# Patient Record
Sex: FEMALE
[De-identification: names, ages, dates, MRNs, and addresses within clinical notes are randomized per-mention and may not be internally consistent; named-entity substitution may affect disease eponyms.]

---

## 2020-08-31 ENCOUNTER — HOSPITAL ENCOUNTER (OUTPATIENT)
Dept: HOSPITAL 5 - ED | Age: 55
Setting detail: OBSERVATION
Discharge: PSYCHIATRIC OR SUBSTANCE USE FACILITY | End: 2020-08-31
Attending: INTERNAL MEDICINE | Admitting: INTERNAL MEDICINE
Payer: COMMERCIAL

## 2020-08-31 VITALS — DIASTOLIC BLOOD PRESSURE: 53 MMHG | SYSTOLIC BLOOD PRESSURE: 97 MMHG

## 2020-08-31 DIAGNOSIS — I25.10: ICD-10-CM

## 2020-08-31 DIAGNOSIS — Z79.82: ICD-10-CM

## 2020-08-31 DIAGNOSIS — Z86.718: ICD-10-CM

## 2020-08-31 DIAGNOSIS — R07.89: Primary | ICD-10-CM

## 2020-08-31 DIAGNOSIS — F17.210: ICD-10-CM

## 2020-08-31 DIAGNOSIS — Z86.73: ICD-10-CM

## 2020-08-31 DIAGNOSIS — J44.9: ICD-10-CM

## 2020-08-31 DIAGNOSIS — Z95.1: ICD-10-CM

## 2020-08-31 LAB
BASOPHILS # (AUTO): 0.1 K/MM3 (ref 0–0.1)
BASOPHILS NFR BLD AUTO: 0.8 % (ref 0–1.8)
BUN SERPL-MCNC: 12 MG/DL (ref 7–17)
BUN/CREAT SERPL: 20 %
CALCIUM SERPL-MCNC: 9.1 MG/DL (ref 8.4–10.2)
EOSINOPHIL # BLD AUTO: 0.2 K/MM3 (ref 0–0.4)
EOSINOPHIL NFR BLD AUTO: 2.4 % (ref 0–4.3)
HCT VFR BLD CALC: 38 % (ref 30.3–42.9)
HEMOLYSIS INDEX: 10
HGB BLD-MCNC: 12.8 GM/DL (ref 10.1–14.3)
LYMPHOCYTES # BLD AUTO: 1.8 K/MM3 (ref 1.2–5.4)
LYMPHOCYTES NFR BLD AUTO: 23.8 % (ref 13.4–35)
MCHC RBC AUTO-ENTMCNC: 34 % (ref 30–34)
MCV RBC AUTO: 91 FL (ref 79–97)
MONOCYTES # (AUTO): 0.5 K/MM3 (ref 0–0.8)
MONOCYTES % (AUTO): 7.2 % (ref 0–7.3)
PLATELET # BLD: 284 K/MM3 (ref 140–440)
RBC # BLD AUTO: 4.2 M/MM3 (ref 3.65–5.03)

## 2020-08-31 PROCEDURE — 96374 THER/PROPH/DIAG INJ IV PUSH: CPT

## 2020-08-31 PROCEDURE — G0378 HOSPITAL OBSERVATION PER HR: HCPCS

## 2020-08-31 PROCEDURE — 93005 ELECTROCARDIOGRAM TRACING: CPT

## 2020-08-31 PROCEDURE — A9502 TC99M TETROFOSMIN: HCPCS

## 2020-08-31 PROCEDURE — 99285 EMERGENCY DEPT VISIT HI MDM: CPT

## 2020-08-31 PROCEDURE — 71045 X-RAY EXAM CHEST 1 VIEW: CPT

## 2020-08-31 PROCEDURE — 82550 ASSAY OF CK (CPK): CPT

## 2020-08-31 PROCEDURE — 93017 CV STRESS TEST TRACING ONLY: CPT

## 2020-08-31 PROCEDURE — 84484 ASSAY OF TROPONIN QUANT: CPT

## 2020-08-31 PROCEDURE — 82553 CREATINE MB FRACTION: CPT

## 2020-08-31 PROCEDURE — 78452 HT MUSCLE IMAGE SPECT MULT: CPT

## 2020-08-31 PROCEDURE — 80048 BASIC METABOLIC PNL TOTAL CA: CPT

## 2020-08-31 PROCEDURE — 85025 COMPLETE CBC W/AUTO DIFF WBC: CPT

## 2020-08-31 PROCEDURE — 36415 COLL VENOUS BLD VENIPUNCTURE: CPT

## 2020-08-31 PROCEDURE — 96375 TX/PRO/DX INJ NEW DRUG ADDON: CPT

## 2020-08-31 PROCEDURE — 96372 THER/PROPH/DIAG INJ SC/IM: CPT

## 2020-08-31 RX ADMIN — NITROGLYCERIN SCH: 20 OINTMENT TOPICAL at 14:00

## 2020-08-31 RX ADMIN — NITROGLYCERIN SCH: 20 OINTMENT TOPICAL at 18:41

## 2020-08-31 RX ADMIN — NITROGLYCERIN SCH INCH: 20 OINTMENT TOPICAL at 11:42

## 2020-08-31 NOTE — TREADMILL REPORT
NUCLEAR PERFUSION SCAN



REFERRING PHYSICIAN:  Dr. Moss 



PROTOCOL:  The patient was assessed in postoperative state, given 10 mCi of

technetium 99m at rest.  The patient underwent rest imaging.  The patient

underwent Lexiscan stress test per standard protocol.  At peak stress, patient

was given 26 mCi of technetium 99m.  Shortly thereafter, the patient underwent

stress imaging.  Raw imaging reveals mild GI artifact, no significant motion

effect.  SPECT imaging examined carefully in horizontal long axis, vertical long

axis, short axis views.  There is normal homogenous uptake of radioisotope in

all four segments.  No evidence of significant fixed or reversible perfusion

defect suggestive of prior infarction or ischemia.  Gated wall motion reveals

normal systolic thickening, calculated ejection fraction 84%.



CONCLUSIONS:

1.  Normal myocardial perfusion scan without evidence of active ischemia or

prior infarction.

2.  Normal left ventricular systolic performance without evidence of transient

ischemic dilatation or stress-induced segmental wall motion abnormalities.





DD: 08/31/2020 10:52

DT: 08/31/2020 13:38

JOB# [id number]  [id number]

SBM/NTS

## 2020-08-31 NOTE — DISCHARGE SUMMARY
Providers





- Providers


Date of Admission: 


08/31/20 03:35





Attending physician: 


MALINDA REESE MD





                                        





08/31/20 04:29


Consult to Mental Health [CONS] Routine 


   Reason For Exam: SUICIDAL IDEATION HISTORY





08/31/20 07:35


Consult to Physician [CONS] Routine 


   Comment: 


   Consulting Provider: EUGENIA BYRNE


   Physician Instructions: 


   Reason For Exam: chest pain











Primary care physician: 


DOTTIE TRAN MD








Hospitalization


Reason for admission: Chest pain


Condition: Fair


Hospital course: 


55 year old female sent over from [facility] because of 

pressure like chest pain that started yesterday evening and associated with 

nausea but no vomiting . There is no history of shortness of breath, fever, 

chills, cough or bodyache. Patient had a document from the psychiatric facility 

from where she came showing 1014 form that is an involuntary confinement 

extension.  





Patient on admission was risk stratified and underwent stress test which was 

negative for acute ischemic changes.  She does not report any chest pain at this

 time she is tolerating her diet and is medically cleared for discharge back to 

[facility].








Atypical chest pain secondary to costochondritis


Suicidal ideation


Nausea now resolved


Disposition: DC/TX-65 PSY HOSP/PSY UNIT


Time spent for discharge: 35-minute





Core Measure Documentation





- Palliative Care


Palliative Care/ Comfort Measures: Not Applicable





- Core Measures


Any of the following diagnoses?: none





Exam





- Physical Exam


Narrative exam: 


VITAL SIGNS:  Reviewed.    


GENERAL:  The patient appears normally developed, Vital signs as documented.


HEAD:  No signs of head trauma.


EYES:  Pupils are equal.  Extraocular motions intact.  


EARS:  Hearing grossly intact.


MOUTH:  Oropharynx is normal. 


NECK:  No adenopathy, no JVD.  


CHEST:  Chest with clear breath sounds bilaterally.  No wheezes, rales, or 

rhonchi.  


CARDIAC:  Regular rate and rhythm.  S1 and S2, without murmurs, gallops, or 

rubs.


VASCULAR:  No Edema.  Peripheral pulses normal and equal in all extremities.


ABDOMEN:  Soft, non tender and non distended.  No   rebound or guarding, and no 

masses palpated.   Bowel Sounds normal.


MUSCULOSKELETAL:  Good range of motion of all major joints. Extremities without 

clubbing, cyanosis or edema.  


NEUROLOGIC EXAM:  Alert and oriented x 3   No focal sensory or strength 

deficits.   Speech normal.  Follows commands.


PSYCHIATRIC:  Mood normal.


SKIN:  detail exam as documented in skin assessment








- Constitutional


Vitals: 


                                        











Temp Pulse Resp BP Pulse Ox


 


 98.9 F   71   17   133/80   96 


 


 08/31/20 11:10  08/31/20 11:42  08/31/20 11:10  08/31/20 11:42  08/31/20 11:10














Plan


Activity: advance as tolerated, fall precautions


Diet: low fat


Special Instructions: record daily weights, record daily BP diary


Follow up with: 


DOTTIE TRAN MD [Primary Care Provider] - 3-5 Days

## 2020-08-31 NOTE — EVENT NOTE
Date: 08/31/20


Detailed cardiology consultation dictated. 


S/p lexiscan MPI stress test this AM which was negative. 


Currently stable cardiac status. Pt may discharge from cardiology standpoint. 

Recommend pt follow up in our office with Dr. MILADYS Long within 2 weeks 

([phone]). 





TOBIAS MARQUEZ NP / DR. MILADYS LONG

## 2020-08-31 NOTE — EMERGENCY DEPARTMENT REPORT
HPI





- General


Chief Complaint: Chest Pain


Time Seen by Provider: 20 01:38





- [facility]


HPI: 





Room 1





The patient is a 55-year-old female present with a chief complaint of chest 

pain.  Patient states this evening she developed aching chest pain that is been 

intermittent in nature and associated with nausea without vomiting.  Patient 

denies shortness of breath or diaphoresis.  The patient is currently a patient 

at [facility] and was sent over for evaluation of her chest pain.  The 

patient states for the past 3 days she has had "heartburn."  The pain that 

started this evening was different from her "heartburn."





ED Past Medical Hx





- Past Medical History


Previous Medical History?: Yes


Hx CVA: Yes (old left weakness)


Hx Heart Attack/AMI: Yes


Hx Deep Vein Thrombosis: Yes


Hx COPD: Yes (No home O2)





- Surgical History


Past Surgical History?: Yes


Hx Coronary Stent: Yes ()





- Family History


Family history: no significant





- Social History


Smoking Status: Current Every Day Smoker (Less than 1 pack/day)


Substance Use Type: Marijuana





ED Review of Systems


ROS: 


Stated complaint: CHEST PAIN


Other details as noted in HPI





Constitutional: denies: diaphoresis


Respiratory: denies: shortness of breath


Cardiovascular: chest pain


Endocrine: no symptoms reported


Gastrointestinal: nausea.  denies: vomiting


Musculoskeletal: back pain





Physical Exam





- Physical Exam


Vital Signs: 


                                   Vital Signs











  20





  01:40


 


Temperature 97.8 F


 


Pulse Rate 88


 


Respiratory 18





Rate 


 


Blood Pressure 120/74





[Right] 


 


O2 Sat by Pulse 94





Oximetry 











Physical Exam: 





GENERAL: The patient is well-developed well-nourished female lying on stretcher 

not appearing to be in acute distress. []


HEENT: Normocephalic.  Atraumatic.  Extraocular motions are intact.  Patient has

 moist mucous membranes.


NECK: Supple.  Trachea midline


CHEST/LUNGS: Clear to auscultation.  There is no respiratory distress noted.


HEART/CARDIOVASCULAR: Regular.  There is no tachycardia.  There is no gallop rub

 or murmur.


ABDOMEN: Abdomen is soft, nontender.  Patient has normal bowel sounds.  There is

 no abdominal distention.


SKIN: There is no rash.  There is no edema.  There is no diaphoresis.


NEURO: The patient is awake, alert, and oriented.  The patient is cooperative.  

The patient has normal speech


MUSCULOSKELETAL: There is no evidence of acute injury.





ED Course


                                   Vital Signs











  20





  01:40


 


Temperature 97.8 F


 


Pulse Rate 88


 


Respiratory 18





Rate 


 


Blood Pressure 120/74





[Right] 


 


O2 Sat by Pulse 94





Oximetry 














ED Medical Decision Making





- Lab Data


Result diagrams: 


                                 20 02:21





                                 20 02:21





                                Laboratory Tests











  20





  02:21 02:21


 


WBC  7.4 


 


RBC  4.20 


 


Hgb  12.8 


 


Hct  38.0 


 


MCV  91 


 


MCH  31 


 


MCHC  34 


 


RDW  13.5 


 


Plt Count  284 


 


Lymph % (Auto)  23.8 


 


Mono % (Auto)  7.2 


 


Eos % (Auto)  2.4 


 


Baso % (Auto)  0.8 


 


Lymph #  1.8 


 


Mono #  0.5 


 


Eos #  0.2 


 


Baso #  0.1 


 


Seg Neutrophils %  65.8 


 


Seg Neutrophils #  4.9 


 


Sodium   139


 


Potassium   3.8


 


Chloride   103.7


 


Carbon Dioxide   22


 


Anion Gap   17


 


BUN   12


 


Creatinine   0.6


 


Estimated GFR   > 60


 


BUN/Creatinine Ratio   20


 


Glucose   102 H


 


Calcium   9.1


 


Total Creatine Kinase   51


 


CK-MB (CK-2)   < 1.0


 


CK-MB (CK-2) Rel Index   1.9


 


Troponin T   < 0.010














- EKG Data


-: EKG Interpreted by Me


EKG shows normal: sinus rhythm


Rate: normal





- EKG Data


When compared to previous EKG there are: previous EKG unavailable


Interpretation: other (No ischemic changes seen)





- Radiology Data


Radiology results: report reviewed (Chest x-ray), image reviewed (Chest x-ray)


interpreted by me: 





Chest x-ray-no focal infiltrates, no pneumothorax





Findings


[facility] [address] [place] [address] 

XRay Report Signed Patient: HAROON MARROQUIN MR#: [id number] : 1965 

Acct:[id number] Age/Sex: 55 / F ADM Date: 20 Loc: ED Attending Dr: 

Ordering Physician: KIMBERLY HERNANDEZ MD Date of Service: 20 Procedure(s): XR 

chest 1V ap Accession Number(s): [id number] cc: KIMBERLY HERNANDEZ MD Fluoro Time In Min

utes: CHEST 1 VIEW INDICATION: chest pain. COMPARISON: None FINDINGS: Support 

devices: None. Heart: Within normal limits. Lungs/Pleura: No acute air space or 

interstitial disease. Additional findings: None. IMPRESSION: No acute 

abnormality. Signer Name: Gerard Rueda MD Signed: 2020 2:43 AM W

orkstation Name: [id number] Transcribed By: ES Dictated By: Gerard Rueda MD Electronically Authenticated By: Gerard Rueda MD Signed 

Date/Time: 20 DD/DT: 20 TD/TT: 











- Differential Diagnosis


ACS, pericarditis, GERD


Critical care attestation.: 


If time is entered above; I have spent that time in minutes in the direct care 

of this critically ill patient, excluding procedure time.








ED Disposition


Clinical Impression: 


 Chest pain





Disposition: DC-09 OP ADMIT IP TO THIS HOSP


Is pt being admited?: Yes


Does the pt Need Aspirin: Yes


Condition: Fair


Instructions:  Chest Pain (ED)


Time of Disposition: 03:30 (Hospitalist paged (Dr Moss))

## 2020-08-31 NOTE — CONSULTATION
REFERRING PHYSICIAN:  Hospitalist service.



REASON FOR CONSULTATION:  Advice and opinion regarding chest pain.



HISTORY OF PRESENT ILLNESS:  The patient is a 55-year-old  female sent

to [facility] from [facility] for chest pain started

yesterday, questionable nausea, but no vomiting.  She is seen in the stress lab.

 She is in involuntary confinement due to her psychiatric history.  She at this

time has no symptoms.  No chest pain, shortness of breath, syncope  or

presyncope.  The patient states she has no symptoms since yesterday, feels much

better.



PAST MEDICAL HISTORY:  Apparently, there is a history of heart disease, more

specifically coronary artery disease and PCI, COPD, questionable history of CVA.



SOCIAL HISTORY:  Denies smoking or drugs.



FAMILY HISTORY:  No family history of premature heart disease.



ALLERGIES:  No known drug, food, or environmental allergies.



REVIEW OF SYSTEMS:  As per HPI.



MEDICATIONS:  Inpatient and outpatient medications reviewed.



PHYSICAL EXAMINATION:

VITAL SIGNS:  Blood pressure is 123/70.  She is afebrile.  Tele reveals sinus

rhythm in the 60s.  No dysrhythmias.

GENERAL:  This is a young  female, in no apparent distress, alert and

oriented x 3.

HEENT:  Sclerae are anicteric.  PERRLA.

NECK:  Supple, no masses, no JVD.

CHEST:  Clear to auscultation bilaterally.  Good air movement.

CARDIOVASCULAR:  Regular rhythm, S1, S2.

ABDOMEN:  Soft, nontender, nondistended.  Normoactive bowel sounds in 4

quadrants.  No mass or bruits.

EXTREMITIES:  No cyanosis, clubbing, or edema.  Good peripheral pulses.

SKIN:  Intact.  No rashes.



DATA:  ECG is nonacute sinus rhythm, normal axis.  Cardiac enzymes negative x 2.

 CBC and CMP unremarkable.



ASSESSMENT AND PLAN:  In summary, the patient is a pleasant 55-year-old

 female.

Chest pain, with typical and atypical features with a history of PCI.  Continue

aspirin.  Chest pain free.  Stress test this a.m. revealed no evidence of

ischemia or prior infarction, normal LV function.  Continue full-dose aspirin,

statin therapy, primary and secondary prevention measures.  She is chest pain

free at this point.  Other treatment as per primary team.  No changes from a

cardiac perspective at this point.  The patient remains asymptomatic.





DD: 08/31/2020 11:13

DT: 08/31/2020 14:16

JOB# [id number]  [id number]

SBM/NTS

## 2020-08-31 NOTE — HISTORY AND PHYSICAL REPORT
History of Present Illness


Date of examination: 08/31/20


Date of admission: 


08/31/20 03:35





Chief complaint: 





CHEST PAIN


History of present illness: 





55 year old female sent over from [place] psychiatric facility because of 

pressure like chest pain that started yesterday evening and associated with 

nausea but no vomiting . There is no history of shortness of breath, fever, 

chills, cough or bodyache. Patient had a document from the psychiatric facility 

from where she came showing 1014 form that is an involuntary confinement 

extension.  





Past History


Past Medical History: CAD, COPD, DVT, stroke


Past Surgical History: Other (CARDIAC STENT)


Social history: no significant social history


Family history: no significant family history





Medications and Allergies


                                    Allergies











Allergy/AdvReac Type Severity Reaction Status Date / Time


 


No Known Allergies Allergy   Verified 08/31/20 04:41











                                Home Medications











 Medication  Instructions  Recorded  Confirmed  Last Taken  Type


 


Albuterol Sulfate [Proair 90 mcg IH Q8H PRN 08/31/20 08/31/20 Unknown History





Respiclick]     


 


Aspirin [Aspirin BABY CHEW TAB] 81 mg PO QDAY 08/31/20 08/31/20 Unknown History


 


Atorvastatin Calcium [Lipitor] 80 mg PO DAILY 08/31/20 08/31/20 Unknown History


 


Cyclobenzaprine [Flexeril] 5 mg PO TID PRN 08/31/20 08/31/20 Unknown History


 


FLUoxetine [PROzac] 20 mg PO QDAY 08/31/20 08/31/20 Unknown History


 


Metoprolol Succinate [Toprol Xl] 25 mg PO DAILY 08/31/20 08/31/20 Unknown 

History


 


amLODIPine [Norvasc] 5 mg PO DAILY 08/31/20 08/31/20 Unknown History


 


traMADoL [Ultram] 50 mg PO Q6HR PRN 08/31/20 08/31/20 Unknown History











Active Meds: 


Active Medications





Acetaminophen (Tylenol)  650 mg PO Q4H PRN


   PRN Reason: Headache


Aspirin (Aspirin)  325 mg PO QDAY [facility]


Heparin Sodium (Porcine) (Heparin)  5,000 unit SUB-Q Q12HR LUISA


Morphine Sulfate (Morphine)  2 mg IV Q4H PRN


   PRN Reason: Pain, Moderate (4-6)


Nitroglycerin (Nitro-Bid 2%)  0.5 inch TP QIDNTG LUISA; Protocol


Nitroglycerin (Nitrostat)  0.4 mg SL .Q5MIN PRN


   PRN Reason: Chest Pain


Ondansetron HCl (Zofran)  4 mg IV Q8H PRN


   PRN Reason: Nausea And Vomiting











Review of Systems


Constitutional: no weight gain, no fever, no chills, no sweats, no fatigue, no 

weakness, no malaise


Eyes: bilateral: other (NO BILATERAL EYE SYMPTOM)


Ears, nose, mouth and throat: no ear pain, no ear discharge, no tinnitis, no 

decreased hearing, no nose pain, no nasal congestion, no dental pain, no mouth 

pain, no dysphagia


Breasts: deferred


Cardiovascular: chest pain, no palpitations, no rapid/irregular heart beat, no 

syncope, no lightheadedness, no shortness of breath


Respiratory: no cough, no excessive sputum, no hemoptysis, no shortness of 

breath, no congestion, no wheezing


Gastrointestinal: nausea, no abdominal pain, no vomiting, no diarrhea, no 

constipation, no change in bowel habits, no hematemesis, no coffee ground emesis


Genitourinary Female: no flank pain


Rectal: no pain


Musculoskeletal: no neck stiffness, no neck pain, no low back pain, no muscle we

akness, no muscle cramps


Integumentary: no rash, no pruritis, no redness, no sores


Neurological: no paralysis, no weakness, no parathesias, no numbness, no 

tingling, no seizures, no syncope, no tremors, no ataxia, no headaches, no 

convulsions, no aphasia, no confusion


Psychiatric: no anxiety, no insomnia, no hypersomnia, no change in appetite, no 

confusion


Endocrine: no cold intolerance, no heat intolerance


Hematologic/Lymphatic: no easy bruising, no easy bleeding


Allergic/Immunologic: no urticaria





Exam





- Constitutional


Vitals: 


                                        











Temp Pulse Resp BP Pulse Ox


 


 97.8 F   67   20   116/57   99 


 


 08/31/20 01:40  08/31/20 04:41  08/31/20 04:42  08/31/20 04:41  08/31/20 04:00











General appearance: Present: no acute distress





- EENT


Eyes: Present: PERRL, EOM intact


ENT: hearing intact, clear oral mucosa, dentition normal





- Neck


Neck: Present: supple, normal ROM





- Respiratory


Respiratory effort: normal





- Cardiovascular


Rhythm: regular


Heart Sounds: Present: S1 & S2.  Absent: gallop, systolic murmur, diastolic 

murmur, click





- Extremities


Extremities: no ischemia, No edema


Peripheral Pulses: within normal limits





- Abdominal


General gastrointestinal: Present: soft, non-tender, non-distended.  Absent: 

tender, distended, rigid, hepatomegaly, splenomegaly, mass


Female genitourinary: Present: deferred





- Rectal


Rectal Exam: deferred





- Integumentary


Integumentary: Present: clear, warm, dry.  Absent: erythema, rash, clammy





- Musculoskeletal


Musculoskeletal: strength equal bilaterally





- Psychiatric


Psychiatric: appropriate mood/affect





HEART Score





- HEART Score


Age: 45-65


Risk factors: 1-2 risk factors


Troponin: 


                                        











Troponin T  < 0.010 ng/mL (0.00-0.029)   08/31/20  02:21    











Troponin: < normal limit





- Critical Actions


Critical Actions: 0-3 pts:0.9-1.7%risk of adverse cardiac event.Candidate for 

discharge





Results





- Labs


CBC & Chem 7: 


                                 08/31/20 02:21





                                 08/31/20 02:21


Labs: 


                             Laboratory Last Values











WBC  7.4 K/mm3 (4.5-11.0)   08/31/20  02:21    


 


RBC  4.20 M/mm3 (3.65-5.03)   08/31/20  02:21    


 


Hgb  12.8 gm/dl (10.1-14.3)   08/31/20  02:21    


 


Hct  38.0 % (30.3-42.9)   08/31/20  02:21    


 


MCV  91 fl (79-97)   08/31/20  02:21    


 


MCH  31 pg (28-32)   08/31/20  02:21    


 


MCHC  34 % (30-34)   08/31/20  02:21    


 


RDW  13.5 % (13.2-15.2)   08/31/20  02:21    


 


Plt Count  284 K/mm3 (140-440)   08/31/20  02:21    


 


Lymph % (Auto)  23.8 % (13.4-35.0)   08/31/20  02:21    


 


Mono % (Auto)  7.2 % (0.0-7.3)   08/31/20  02:21    


 


Eos % (Auto)  2.4 % (0.0-4.3)   08/31/20  02:21    


 


Baso % (Auto)  0.8 % (0.0-1.8)   08/31/20  02:21    


 


Lymph #  1.8 K/mm3 (1.2-5.4)   08/31/20  02:21    


 


Mono #  0.5 K/mm3 (0.0-0.8)   08/31/20  02:21    


 


Eos #  0.2 K/mm3 (0.0-0.4)   08/31/20  02:21    


 


Baso #  0.1 K/mm3 (0.0-0.1)   08/31/20  02:21    


 


Seg Neutrophils %  65.8 % (40.0-70.0)   08/31/20  02:21    


 


Seg Neutrophils #  4.9 K/mm3 (1.8-7.7)   08/31/20  02:21    


 


Sodium  139 mmol/L (137-145)   08/31/20  02:21    


 


Potassium  3.8 mmol/L (3.6-5.0)   08/31/20  02:21    


 


Chloride  103.7 mmol/L ()   08/31/20  02:21    


 


Carbon Dioxide  22 mmol/L (22-30)   08/31/20  02:21    


 


Anion Gap  17 mmol/L  08/31/20  02:21    


 


BUN  12 mg/dL (7-17)   08/31/20  02:21    


 


Creatinine  0.6 mg/dL (0.6-1.2)   08/31/20  02:21    


 


Estimated GFR  > 60 ml/min  08/31/20  02:21    


 


BUN/Creatinine Ratio  20 %  08/31/20  02:21    


 


Glucose  102 mg/dL ()  H  08/31/20  02:21    


 


Calcium  9.1 mg/dL (8.4-10.2)   08/31/20  02:21    


 


Total Creatine Kinase  51 units/L ()   08/31/20  02:21    


 


CK-MB (CK-2)  < 1.0 ng/mL (0.0-4.0)   08/31/20  02:21    


 


CK-MB (CK-2) Rel Index  1.9  (0-4)   08/31/20  02:21    


 


Troponin T  < 0.010 ng/mL (0.00-0.029)   08/31/20  02:21    











Field/IV: 


                                        





IV Catheter Type [Right          INT / Saline Lock


Antecubital]                     











Assessment and Plan





- Patient Problems


(1) Chest pain


Current Visit: Yes   Status: Acute   


Plan to address problem: 


1. TELEMETRY OBSERVATION


 2. SERIAL CARDIAC ENZYME


 3. NPO


 4 LEXISCAN STRESS TEST


 5. NITROGLYCERIN (PASTE AND SUBLINGUAL TYPES)


 6. I.V MORPHINE FOR PAIN


 7. I.V ZOFRAN FOR NAUSEA AND VOMITING


 8. OXYGEN BY NASAL CANNULA


 9. PSYCHIATRIC/ MENTAL HEALTH  CONSULT TO ADDRESS THE ISSUE OF INVOLUNTARY 

CONFINEMENT

## 2020-08-31 NOTE — XRAY REPORT
CHEST 1 VIEW 



INDICATION: 

chest pain.



COMPARISON: 

None



FINDINGS:

Support devices: None.



Heart: Within normal limits. 

Lungs/Pleura: No acute air space or interstitial disease. 



Additional findings: None.



IMPRESSION: No acute abnormality.



Signer Name: Gerard Rueda MD 

Signed: 8/31/2020 2:43 AM

Workstation Name: [id number]

## 2020-08-31 NOTE — CONSULTATION
History of Present Illness





- Reason for Consult


Consult date: 08/31/20


Reason for consult: hx of Si





- History of Present Psychiatric Illness


Susanna Reich is a 54y/o female patient who presented to the ER for chest pain. 

During my interview with the patient she is lying in bed awake. She is a/o x 3. 

She is calm and cooperative. She makes good eye contact. The patient denies 

SI/HI. She states, "I never was. I don't know who told you that." She says "I've

had one attempt awhile ago." The patient denies hallucinations of any kind. She 

says her mood is "alright." The patient says she has a history of "depression 

and anxiety." She says she takes "prozac." She denies any illicit drug use, 

alcohol or nicotine. 





PAST PSYCHIATRIC HISTORY: 


Diagnoses: "depression and anxiety"


Suicide attempts or Self-harm behavior: Yes


Prior psychiatric hospitalizations: Yes


Substance Abuse history: Denies


Previous psychiatric medications tried: "prozac"


Outpatient treatment: yes





PAST MEDICAL HISTORY: None reported





Family Psychiatric History: None reported or documented





SOCIAL HISTORY


Marital Status: 


Living Arrangements: with family


Employment Status: Employed


Access to guns/weapons: Denies


Education: High school 


History of Abuse: Denies


Legal History: Denies





REVIEW OF SYSTEMS


Constitutional: Negative for weight loss


ENT: Negative for stridor


Respiratory: Negative for cough or hemoptysis


All other systems reviewed and are negative





MSE


Appearance: Wearing appropriate clothing.


Behavior: calm and cooperative


Mood: "okay"


Affect: Congruent with stated mood


Thought Process: Goal directed


Speech: normal tone and pace


Thought Content


    Suicidal: Denies


    Homicidal: Denies


    Hallucinations: Denies


    Delusions: none elicited


Consciousness: alert.


Cognition/Memory: Normal


Insight/Judgment: Normal





Diagnoses:


Major Depressive Disorder by history


Generalized Anxiety Disorder by history





Treatment Plan 


Continue home medications previously prescribed by outpatient psychiatrist


Sitter: Defer to primary


Medical: Per primary


Disposition: Do not recommend acute inpatient psychiatric treatment


Will sign off. Thank you for this consult.














Medications and Allergies


                                    Allergies











Allergy/AdvReac Type Severity Reaction Status Date / Time


 


No Known Allergies Allergy   Verified 08/31/20 04:41











                                Home Medications











 Medication  Instructions  Recorded  Confirmed  Last Taken  Type


 


Albuterol Sulfate [Proair 90 mcg IH Q8H PRN 08/31/20 08/31/20 Unknown History





Respiclick]     


 


Aspirin [Aspirin BABY CHEW TAB] 81 mg PO QDAY 08/31/20 08/31/20 Unknown History


 


Atorvastatin Calcium [Lipitor] 80 mg PO DAILY 08/31/20 08/31/20 Unknown History


 


Cyclobenzaprine [Flexeril 10 MG 5 mg PO TID PRN 08/31/20 08/31/20 Unknown 

History





TAB]     


 


FLUoxetine [PROzac] 20 mg PO QDAY 08/31/20 08/31/20 Unknown History


 


Metoprolol Succinate [Toprol Xl] 25 mg PO DAILY 08/31/20 08/31/20 Unknown 

History


 


amLODIPine 5 mg PO DAILY 08/31/20 08/31/20 Unknown History


 


traMADoL [Ultram 50 MG tab] 50 mg PO Q6HR PRN 08/31/20 08/31/20 Unknown History











Active Meds: 


Active Medications





Acetaminophen (Tylenol)  650 mg PO Q4H PRN


   PRN Reason: Headache


Albuterol (Proventil)  2.5 mg IH Q4HRT PRN


   PRN Reason: Shortness Of Breath


Amlodipine Besylate (Amlodipine)  5 mg PO DAILY [facility]


   Last Admin: 08/31/20 11:40 Dose:  5 mg


   Documented by: 


Aspirin (Aspirin)  325 mg PO QDAY [facility]


   Last Admin: 08/31/20 11:40 Dose:  325 mg


   Documented by: 


Atorvastatin Calcium (Lipitor)  80 mg PO QHS [facility]


Cyclobenzaprine HCl (Flexeril)  5 mg PO TID PRN


   PRN Reason: Muscle Spasm


Fluoxetine HCl (Prozac)  20 mg PO QDAY [facility]


   Last Admin: 08/31/20 11:41 Dose:  20 mg


   Documented by: 


Heparin Sodium (Porcine) (Heparin)  5,000 unit SUB-Q Q12HR [facility]


   Last Admin: 08/31/20 11:41 Dose:  5,000 unit


   Documented by: 


Metoprolol Succinate (Metoprolol Xl)  25 mg PO DAILY [facility]


   Last Admin: 08/31/20 11:41 Dose:  25 mg


   Documented by: 


Morphine Sulfate (Morphine)  2 mg IV Q4H PRN


   PRN Reason: Pain, Moderate (4-6)


Nitroglycerin (Nitro-Bid 2%)  0.5 inch TP QIDNTG [facility]; Protocol


   Last Admin: 08/31/20 11:42 Dose:  0.5 inch


   Documented by: 


Nitroglycerin (Nitrostat)  0.4 mg SL .Q5MIN PRN


   PRN Reason: Chest Pain


Ondansetron HCl (Zofran)  4 mg IV Q8H PRN


   PRN Reason: Nausea And Vomiting











Mental Status Exam





- Vital signs


                                Last Vital Signs











Temp  98.9 F   08/31/20 11:10


 


Pulse  71   08/31/20 11:42


 


Resp  17   08/31/20 11:10


 


BP  133/80   08/31/20 11:42


 


Pulse Ox  96   08/31/20 11:10














Results


Result Diagrams: 


                                 08/31/20 02:21





                                 08/31/20 02:21


                              Abnormal lab results











  08/31/20 Range/Units





  02:21 


 


Glucose  102 H  ()  mg/dL








All other labs normal.